# Patient Record
Sex: FEMALE | Employment: FULL TIME | URBAN - METROPOLITAN AREA
[De-identification: names, ages, dates, MRNs, and addresses within clinical notes are randomized per-mention and may not be internally consistent; named-entity substitution may affect disease eponyms.]

---

## 2017-10-24 ENCOUNTER — DOCUMENTATION ONLY (OUTPATIENT)
Dept: SLEEP MEDICINE | Age: 67
End: 2017-10-24

## 2017-11-29 ENCOUNTER — OFFICE VISIT (OUTPATIENT)
Dept: SLEEP MEDICINE | Age: 67
End: 2017-11-29

## 2017-11-29 ENCOUNTER — DOCUMENTATION ONLY (OUTPATIENT)
Dept: SLEEP MEDICINE | Age: 67
End: 2017-11-29

## 2017-11-29 VITALS
BODY MASS INDEX: 21.07 KG/M2 | OXYGEN SATURATION: 95 % | DIASTOLIC BLOOD PRESSURE: 78 MMHG | HEART RATE: 68 BPM | SYSTOLIC BLOOD PRESSURE: 138 MMHG | HEIGHT: 68 IN | WEIGHT: 139 LBS

## 2017-11-29 DIAGNOSIS — G47.33 OBSTRUCTIVE SLEEP APNEA SYNDROME: Primary | ICD-10-CM

## 2017-11-29 RX ORDER — ACETAMINOPHEN 325 MG/1
TABLET ORAL
COMMUNITY

## 2017-11-29 NOTE — PATIENT INSTRUCTIONS
[address] [place]. [place], [address]  Tel.  [phone]  Fax. 100 [facility] 60  [place], [address] [facility]  Tel.  [phone]  Fax. [phone] [address] [facility] [address]  Tel.  [phone]  Fax. [phone]     Sleep Apnea: After Your Visit  Your Care Instructions  Sleep apnea occurs when you frequently stop breathing for 10 seconds or longer during sleep. It can be mild to severe, based on the number of times per hour that you stop breathing or have slowed breathing. Blocked or narrowed airways in your nose, mouth, or throat can cause sleep apnea. Your airway can become blocked when your throat muscles and tongue relax during sleep. Sleep apnea is common, occurring in 1 out of 20 individuals. Individuals having any of the following characteristics should be evaluated and treated right away due to high risk and detrimental consequences from untreated sleep apnea:  1. Obesity  2. Congestive Heart failure  3. Atrial Fibrillation  4. Uncontrolled Hypertension  5. Type II Diabetes  6. Night-time Arrhythmias  7. Stroke  8. Pulmonary Hypertension  9. High-risk Driving Populations (pilots, truck drivers, etc.)  10. Patients Considering Weight-loss Surgery    How do you know you have sleep apnea? You probably have sleep apnea if you answer 'yes' to 3 or more of the following questions:  S - Have you been told that you Snore? T - Are you often Tired during the day? O - Has anyone Observed you stop breathing while sleeping? P- Do you have (or are being treated for) high blood Pressure? B - Are you obese (Body Mass Index > 35)? A - Is your Age 48years old or older? N - Is your Neck size greater than 16 inches? G - Are you male Gender? A sleep physician can prescribe a breathing device that prevents tissues in the throat from blocking your airway.  Or your doctor may recommend using a dental device (oral breathing device) to help keep your airway open. In some cases, surgery may be needed to remove enlarged tissues in the throat. Follow-up care is a key part of your treatment and safety. Be sure to make and go to all appointments, and call your doctor if you are having problems. It's also a good idea to know your test results and keep a list of the medicines you take. How can you care for yourself at home? · Lose weight, if needed. It may reduce the number of times you stop breathing or have slowed breathing. · Go to bed at the same time every night. · Sleep on your side. It may stop mild apnea. If you tend to roll onto your back, sew a pocket in the back of your pajama top. Put a tennis ball into the pocket, and stitch the pocket shut. This will help keep you from sleeping on your back. · Avoid alcohol and medicines such as sleeping pills and sedatives before bed. · Do not smoke. Smoking can make sleep apnea worse. If you need help quitting, talk to your doctor about stop-smoking programs and medicines. These can increase your chances of quitting for good. · Prop up the head of your bed 4 to 6 inches by putting bricks under the legs of the bed. · Treat breathing problems, such as a stuffy nose, caused by a cold or allergies. · Use a continuous positive airway pressure (CPAP) breathing machine if lifestyle changes do not help your apnea and your doctor recommends it. The machine keeps your airway from closing when you sleep. · If CPAP does not help you, ask your doctor whether you should try other breathing machines. A bilevel positive airway pressure machine has two types of air pressureâone for breathing in and one for breathing out. Another device raises or lowers air pressure as needed while you breathe. · If your nose feels dry or bleeds when using one of these machines, talk with your doctor about increasing moisture in the air. A humidifier may help.   · If your nose is runny or stuffy from using a breathing machine, talk with your doctor about using decongestants or a corticosteroid nasal spray. When should you call for help? Watch closely for changes in your health, and be sure to contact your doctor if:  · You still have sleep apnea even though you have made lifestyle changes. · You are thinking of trying a device such as CPAP. · You are having problems using a CPAP or similar machine. Where can you learn more? Go to [employer]. Enter C410 in the search box to learn more about \"Sleep Apnea: After Your Visit. \"   © 6628-5417 Healthwise, [employer]!. Care instructions adapted under license by Madelyn Angulo (which disclaims liability or warranty for this information). This care instruction is for use with your licensed healthcare professional. If you have questions about a medical condition or this instruction, always ask your healthcare professional. Radha Cowing any warranty or liability for your use of this information. PROPER SLEEP HYGIENE    What to avoid  · Do not have drinks with caffeine, such as coffee or black tea, for 8 hours before bed. · Do not smoke or use other types of tobacco near bedtime. Nicotine is a stimulant and can keep you awake. · Avoid drinking alcohol late in the evening, because it can cause you to wake in the middle of the night. · Do not eat a big meal close to bedtime. If you are hungry, eat a light snack. · Do not drink a lot of water close to bedtime, because the need to urinate may wake you up during the night. · Do not read or watch TV in bed. Use the bed only for sleeping and sexual activity. What to try  · Go to bed at the same time every night, and wake up at the same time every morning. Do not take naps during the day. · Keep your bedroom quiet, dark, and cool. · Get regular exercise, but not within 3 to 4 hours of your bedtime. .  · Sleep on a comfortable pillow and mattress.   · If watching the clock makes you anxious, turn it facing away from you so you cannot see the time. · If you worry when you lie down, start a worry book. Well before bedtime, write down your worries, and then set the book and your concerns aside. · Try meditation or other relaxation techniques before you go to bed. · If you cannot fall asleep, get up and go to another room until you feel sleepy. Do something relaxing. Repeat your bedtime routine before you go to bed again. · Make your house quiet and calm about an hour before bedtime. Turn down the lights, turn off the TV, log off the computer, and turn down the volume on music. This can help you relax after a busy day. Drowsy Driving  The [address] [place] [address] Road Traffic Safety Administration cites drowsiness as a causing factor in more than 225,966 police reported crashes annually, resulting in 76,000 injuries and 1,500 deaths. Other surveys suggest 55% of people polled have driven while drowsy in the past year, 23% had fallen asleep but not crashed, 3% crashed, and 2% had and accident due to drowsy driving. Who is at risk? Young Drivers: One study of drowsy driving accidents states that 55% of the drivers were under 25 years. Of those, 75% were male. Shift Workers and Travelers: People who work overnight or travel across time zones frequently are at higher risk of experiencing Circadian Rhythm Disorders. They are trying to work and function when their body is programed to sleep. Sleep Deprived: Lack of sleep has a serious impact on your ability to pay attention or focus on a task. Consistently getting less than the average of 8 hours your body needs creates partial or cumulative sleep deprivation. Untreated Sleep Disorders: Sleep Apnea, Narcolepsy, R.L.S., and other sleep disorders (untreated) prevent a person from getting enough restful sleep. This leads to excessive daytime sleepiness and increases the risk for drowsy driving accidents by up to 7 times.   Medications / Alcohol: Even over the counter medications can cause drowsiness. Medications that impair a drivers attention should have a warning label. Alcohol naturally makes you sleepy and on its own can cause accidents. Combined with excessive drowsiness its effects are amplified. Signs of Drowsy Driving:   * You don't remember driving the last few miles   * You may drift out of your mary   * You are unable to focus and your thoughts wander   * You may yawn more often than normal   * You have difficulty keeping your eyes open / nodding off   * Missing traffic signs, speeding, or tailgating  Prevention-   Good sleep hygiene, lifestyle and behavioral choices have the most impact on drowsy driving. There is no substitute for sleep and the average person requires 8 hours nightly. If you find yourself driving drowsy, stop and sleep. Consider the sleep hygiene tips provided during your visit as well. Medication Refill Policy: Refills for all medications require 1 week advance notice. Please have your pharmacy fax a refill request. We are unable to fax, or call in \"controled substance\" medications and you will need to pick these prescriptions up from our office. [employer] Activation    Thank you for requesting access to [employer]. Please follow the instructions below to securely access and download your online medical record. [employer] allows you to send messages to your doctor, view your test results, renew your prescriptions, schedule appointments, and more. How Do I Sign Up? 1. In your internet browser, go to [email or web address]. 2. Click on the First Time User? Click Here link in the Sign In box. You will see the New Member Sign Up page. 3. Enter your [employer] Access Code exactly as it appears below. You will not need to use this code after youve completed the sign-up process. If you do not sign up before the expiration date, you must request a new code.     [employer] Access Code: [email or web address]  Expires: 2/27/2018  3:29 PM (This is the date your ePrep access code will )    4. Enter the last four digits of your Social Security Number (xxxx) and Date of Birth (mm/dd/yyyy) as indicated and click Submit. You will be taken to the next sign-up page. 5. Create a Micro[employer] Plan[employer] An[employer]nast ID. This will be your ePrep login ID and cannot be changed, so think of one that is secure and easy to remember. 6. Create a ePrep password. You can change your password at any time. 7. Enter your Password Reset Question and Answer. This can be used at a later time if you forget your password. 8. Enter your e-mail address. You will receive e-mail notification when new information is available in [address] 9. Click Sign Up. You can now view and download portions of your medical record. 10. Click the Download Summary menu link to download a portable copy of your medical information. Additional Information    If you have questions, please call [phone]. Remember, ePrep is NOT to be used for urgent needs. For medical emergencies, dial 911.

## 2017-11-29 NOTE — PROGRESS NOTES
[address] [facility] [place]. [place], [address]  Tel.  [phone]  Fax. 100 [facility] 60  [place], [address] [facility]  Tel.  [phone]  Fax. [phone] [address]  Tel.  [phone]  Fax. [phone]         Subjective:      Sue Mcelroy is an 69 y.o. female referredby Dr. Cesilia Salazar,  for evaluation for a sleep disorder. She complains of snoring associated with excessive daytime sleepiness, difficulty falling asleep once awakened. Symptoms began a few years ago, gradually worsening since that time. She usually can fall asleep in variable minutes. . She denies falling asleep while driving. Sue Mcelroy does wake up frequently at night. She is bothered by waking up too early and left unable to get back to sleep. She actually sleeps about 7 hours at night and wakes up about 3 times during the night. She does not work shifts:  . Brett Lai indicates she does get too little sleep at night. Her bedtime is 2200. She awakens at 0400. She does take naps. She takes 2 naps a week lasting 2. She has the following observed behaviors: Loud snoring, Grinding teeth;  . Other remarks:   +morning headaches. Dry throat on waking. [place] Sleepiness Score: 7      No Known Allergies      Current Outpatient Prescriptions:     acetaminophen (TYLENOL) 325 mg tablet, Take  by mouth every four (4) hours as needed for Pain., Disp: , Rfl:     omega-3 fatty acids-vitamin e 1,000 mg cap, Take 1 Cap by mouth daily. , Disp: , Rfl:     CALCIUM PO, Take  by mouth daily. , Disp: , Rfl:     TURMERIC ROOT EXTRACT PO, Take  by mouth daily. , Disp: , Rfl:     traMADol (ULTRAM) 50 mg tablet, Take 1 Tab by mouth every six (6) hours as needed for Pain. Max Daily Amount: 200 mg., Disp: 20 Tab, Rfl: 0    denosumab (PROLIA) 60 mg/mL injection, 60 mg by SubCUTAneous route. Takes twice yearly, Disp: , Rfl:     multivitamin (ONE A DAY) tablet, Take 1 Tab by mouth daily. , Disp: , Rfl:      She  has a past medical history of Ill-defined condition; Osteoporosis; Polio; and Shingles. She  has no past surgical history on file. She family history is not on file. She  reports that she has never smoked. She has never used smokeless tobacco. She reports that she does not drink alcohol or use illicit drugs. Review of Systems:  Constitutional:  No significant weight loss or weight gain. Eyes:  No blurred vision. CVS:  No significant chest pain  Pulm:  No significant shortness of breath  GI:  No significant nausea or vomiting  :  + significant nocturia  Musculoskeletal:  + significant joint pain at night  Skin:  No significant rashes  Neuro:  No significant dizziness   Psych:  No active mood issues    Sleep Review of Systems: notable for no difficulty falling asleep; +frequent awakenings at night;  regular dreaming noted; no nightmares ; +early morning headaches; no memory problems; no concentration issues; no history of any automobile or occupational accidents due to daytime drowsiness. Objective:     Visit Vitals    /78    Pulse 68    Ht 5' 7.5\" (1.715 m)    Wt 139 lb (63 kg)    SpO2 95%    BMI 21.45 kg/m2         General:   Not in acute distress   Eyes:  Anicteric sclerae, no obvious strabismus   Nose:  No obvious nasal septum deviation    Oropharynx:   Class 2 oropharyngeal outlet,  low-lying soft palate, narrow tonsilo-pharyngeal pilars   Tonsils:   tonsils are absent   Neck:   Neck circ. in \"inches\": 12.25; midline trachea   Chest/Lungs:  Equal lung expansion, clear on auscultation    CVS:  Normal rate, regular rhythm; no JVD   Skin:  Warm to touch; no obvious rashes   Neuro:  No focal deficits ; no obvious tremor    Psych:  Normal affect,  normal countenance;          Assessment:       ICD-10-CM ICD-9-CM    1.  Obstructive sleep apnea syndrome G47.33 327.23 POLYSOMNOGRAPHY 1 NIGHT         Plan:     * The patient currently has a Moderate Risk for having sleep apnea.  STOP-BANG score 3.  * PSG was ordered for initial evaluation. She prefers the in lab study. We will follow the American Academy of Sleep Medicine protocol regarding split-night procedures and offering a trial of Positive Airway Pressure (CPAP, BPAP, etc.)  * She was provided information on sleep apnea including coresponding risk factors and the importance of proper treatment. * Counseling was provided regarding proper sleep hygiene and safe driving. Treatment options for sleep apnea were reviewed. she is not against a trial of PAP if found to have significant sleep apnea. She will follow-up with Dr. Tati Santos regarding her nasal symptoms. *I will call her with the results. Thank you for allowing us to participate in your patient's medical care. We'll keep you updated on these investigations.   Shayne Gary MD  Diplomate in Sleep Medicine  [facility]

## 2017-11-29 NOTE — Clinical Note
Thank you for the referral.  I will keep you informed of her progress.  [address], [place]

## 2018-02-06 ENCOUNTER — TELEPHONE (OUTPATIENT)
Dept: SLEEP MEDICINE | Age: 68
End: 2018-02-06

## 2018-02-06 DIAGNOSIS — G47.33 OBSTRUCTIVE SLEEP APNEA SYNDROME: Primary | ICD-10-CM

## 2018-02-06 NOTE — TELEPHONE ENCOUNTER
Patient called stating that she would prefer to do an HST rather than an in lab study. Please advise. She can be reached at [phone].

## 2018-02-14 NOTE — TELEPHONE ENCOUNTER
Patient returned call and has decided to proceed with a PSG as discussed before. Scheduled PSG for 3/21/18.

## 2018-03-21 ENCOUNTER — HOSPITAL ENCOUNTER (OUTPATIENT)
Dept: SLEEP MEDICINE | Age: 68
Discharge: HOME | End: 2018-03-21
Payer: SELF-PAY

## 2018-03-21 DIAGNOSIS — G47.33 OBSTRUCTIVE SLEEP APNEA SYNDROME: ICD-10-CM

## 2018-03-21 PROCEDURE — 95810 POLYSOM 6/> YRS 4/> PARAM: CPT | Performed by: INTERNAL MEDICINE

## 2018-03-22 VITALS
TEMPERATURE: 98 F | HEIGHT: 68 IN | BODY MASS INDEX: 21.5 KG/M2 | HEART RATE: 74 BPM | OXYGEN SATURATION: 96 % | WEIGHT: 141.9 LBS | DIASTOLIC BLOOD PRESSURE: 82 MMHG | SYSTOLIC BLOOD PRESSURE: 127 MMHG

## 2018-03-26 ENCOUNTER — TELEPHONE (OUTPATIENT)
Dept: SLEEP MEDICINE | Age: 68
End: 2018-03-26

## 2018-03-26 NOTE — TELEPHONE ENCOUNTER
Polysomnogram was performed and the results of the study were explained to the patient. Please refer to interpretation report for further details. Apnea/Hypopnea index of 2 which indicates no significant apnea. She continues to have snoring, nasal congestion. She did not have nasal congestion at the sleep center and snoring was mild. She is wondering if she is allergic to her cat.     All of her questions addressed

## 2021-02-24 ENCOUNTER — TELEPHONE (OUTPATIENT)
Dept: WOUND CARE | Age: 71
End: 2021-02-24

## 2021-02-24 NOTE — TELEPHONE ENCOUNTER
New Patient Appointment Reminder and [phone] Screening     Have you been tested for or have you been around anyone that has confirmed or been suspected positive for Covid-19 in past 14 days? No      If yes then  CANCEL VISIT AND MAKE A VIRTUAL VISIT IF POSSIBLE  :    Stay home and monitor your health   Stay home for 14 days after your last contact with a person who has COVID-19    Watch for fever (100. 4? F), cough, shortness of breath, or other symptoms of COVID-19 advise CDC website for more information   If possible, stay away from others, especially people who are at higher risk for getting very sick from COVID-19    Does Patient have fever and/or lower respiratory symptoms, (shortness of breath, difficulty breathing, cough) loss taste or smell, sore throat, muscle or body aches, nasal congestion or runny nose, nausea/vomiting,diarrhea ? No     If yes then CANCEL VISIT AND MAKE A VIRTUAL VISIT IF POSSIBLE :    Referred to PCP or to the closest ED   No       Open travel questions and document patient responses. If No stop here and give patient reminder time for appointment and ask them please limit to having only 1 person accompany to appointment if necessary, no children under 18 are allowed at visits. Please call into office day of appointment to notify arrival.Remind all patients and caretakers they must wear a mask when entering into the wound clinic. Reminder Appointment  date and time given: Yes      [email or web address]    [email or web address]      ns and document patient responses. Not COVID-19. Proceed with scheduling  appointment    1. Provider instructs patient to travel directly to inpatient ED. 2. Provider to notify local ED of patients pending arrival.  3. Notify local health department and infection prevention immediately.   4. Obtain exposure list to include staff, others in waiting room and transport staff and  submit to infection prevention. Communication to the patient:   Due to your recent history and reported symptoms, we ask that you go to an emergency  department for further evaluation.  You should wear a facemask when you are in the same room with other people and  when you visit a health care provider. If you cannot wear a facemask, other practical  means of protection can be used, such as a wash cloth.  Cover your mouth and nose with a tissue when you cough or sneeze, or you can  cough or sneeze into your sleeve. Throw used tissues away, and immediately wash  your hands with soap and water for at least 20 seconds.  Wash your hands often and thoroughly with soap and water for at least 20 seconds. You can use an alcohol-based hand , if soap and water are not available.

## 2021-02-25 ENCOUNTER — HOSPITAL ENCOUNTER (OUTPATIENT)
Dept: WOUND CARE | Age: 71
Discharge: HOME | End: 2021-02-25
Payer: MEDICARE

## 2021-02-25 VITALS
HEART RATE: 65 BPM | SYSTOLIC BLOOD PRESSURE: 147 MMHG | RESPIRATION RATE: 18 BRPM | TEMPERATURE: 97.3 F | WEIGHT: 139 LBS | BODY MASS INDEX: 21.82 KG/M2 | HEIGHT: 67 IN | DIASTOLIC BLOOD PRESSURE: 81 MMHG

## 2021-02-25 PROCEDURE — 11042 DBRDMT SUBQ TIS 1ST 20SQCM/<: CPT

## 2021-02-25 PROCEDURE — 99213 OFFICE O/P EST LOW 20 MIN: CPT

## 2021-02-25 NOTE — WOUND CARE
Gonzalez Salinas DPM - Renee Pozo. [facility], [address] [place] [address] - H&P Assessment/Plan: 
Left foot stage 3 pressure ulcer (S90.020) - Pt evaluated and treated. - Wound debrided as noted in the Procedure Note to healthy granular bleeding margins. 
- Dressing consisting of betadine paint applied. 
- Pt has bilateral AFO braces and an insert for her right foot. Suggested she get insert for left with an offloading delve. Will bring felt to make a donut cut out.  
- F/U 2 weeks. Subjective: 
Pt complains of wound to the left foot. Previous tx include neosporin. Denies having symptoms of fever, chills, nausea, vomiting, chest pain, shortness of breath. HPI: Mr. Kristina Cordero is a 77yo female with a PMH of osteoporosis, polio, and shingles who presents with a left foot wound. She first noticed an issue February 14th with redness to her dorsal lateral foot. She went to [facility] and they prescribed Doxycyline for cellulitis. She has always had a small callus in the area, but thinks it got worse when she changed shoes. Her foot seemed to slip around more in the shoe. ROS: 
Consitutional: no weight loss, night sweats, fatigue / malaise / lethargy. Musculoskeletal: no joint / extremity pain, misalignment, stiffness, decreased ROM, crepitus. Integument: No pruritis, rashes, lesions, left foot wound. Psychiatric: No depression, anxiety, paranoia History: 
Left foot wound No Known Allergies Family History Problem Relation Age of Onset  Dementia Father Past Medical History:  
Diagnosis Date  Ill-defined condition   
 fall, sacral bone fracture  Osteoporosis R hip  Polio  Shingles Past Surgical History:  
Procedure Laterality Date  HX HEENT    
 tonsillectomy  HX ORTHOPAEDIC    
 right leg - polio Social History Tobacco Use  Smoking status: Never Smoker  Smokeless tobacco: Never Used Substance Use Topics  Alcohol use: No  
  Comment: rarely Social History Substance and Sexual Activity Alcohol Use No  
 Comment: rarely Social History Substance and Sexual Activity Drug Use No  
  
Social History Tobacco Use Smoking Status Never Smoker Smokeless Tobacco Never Used Current Outpatient Medications Medication Sig  
 ascorbic acid (SUPER C PO) Take  by mouth.  b complex-vitamin c-folic acid (NEPHROCAPS) 1 mg capsule Take 1 Cap by mouth daily.  denosumab (PROLIA) 60 mg/mL injection 60 mg by SubCUTAneous route. Takes twice yearly  CALCIUM PO Take  by mouth daily.  TURMERIC ROOT EXTRACT PO Take  by mouth daily.  traMADol (ULTRAM) 50 mg tablet Take 1 Tab by mouth every six (6) hours as needed for Pain. Max Daily Amount: 200 mg.  acetaminophen (TYLENOL) 325 mg tablet Take  by mouth every four (4) hours as needed for Pain.  omega-3 fatty acids-vitamin e 1,000 mg cap Take 1 Cap by mouth daily.  multivitamin (ONE A DAY) tablet Take 1 Tab by mouth daily. No current facility-administered medications for this encounter. Objective: 
Visit Vitals BP (!) 147/81 (BP 1 Location: Left upper arm, BP Patient Position: Sitting) Pulse 65 Temp 97.3 °F (36.3 °C) Resp 18 Ht 5' 7\" (1.702 m) Wt 63 kg (139 lb) BMI 21.77 kg/m² Vascular: B/L LE 
DP 2/4; PT 2/4 
capillary fill time brisk, pitting edema is present, skin temperature is cool, varicosities are present. Dermatological: 
 
Wound: 1 Location: left foot submet 5 Measurements: per RN note Margins: hyperkeratotic Drainage: scant serous Odor: none Wound base: granular Lymphangitic streaking? No. 
Undermining? No. 
Sinus tracts? No. 
Exposed bone? No. 
Subcutaneous crepitation on palpation? No. 
 
 
Nails WNL. Skin is dry and scaly, exhibits hemosiderin deposition.    
 
There is no maceration of the interspaces of the feet b/l. Lesions are present consisting of hyperkeratosis at left submet 5. Neurological: DTR are present, protective sensation per 5.07 [place] monofilament is intact, patient is AAOx3, mood is normal. Epicritic sensation is intact. Orthopedic: 
LE deformities due to Polio. ROM of ankle, STJ, 1st MTPJ is limited, MMT 4+ out of 5 for B/L LE. No pedal amputations. Constitutional: Pt is a well developed, pleasant and active female. 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 Lymphatics: negative tenderness to palpation of neck/axillary/inguinal nodes.  
 
Imaging / Brit Monte / Cx / Px: 
Skipper Lemon

## 2021-02-25 NOTE — DISCHARGE INSTRUCTIONS
Discharge Instructions for  [address], [address] [place] [address] Avenue  Telephone: [phone]    NAME:  Antoinette Chacko OF BIRTH:  1950  MEDICAL RECORD NUMBER:  [id number]  DATE:  2/25/2021     Dressings:           Wound Location  Left foot   [x] Apply Primary Dressing:  Paint with betadine and cover with foam border dressing. Change Daily. Dietary:  [x] Diet as tolerated: [x] Increase Protein: Activity:  [x] Activity as tolerated:               Return Appointment:  [x] Return Appointment: With  Rossy  in   2  [facility])     Electronically signed on 2/25/2021 at 9:34 AM     Rosalina Mccrary 281: Should you experience any significant changes in your wound(s) or have questions about your wound care, please contact the [facility] Main at [address] [place] [address] 8:00 am - 4:30. If you need help with your wound outside these hours and cannot wait until we are again available, contact your PCP or go to the hospital emergency room. PLEASE NOTE: IF YOU ARE UNABLE TO OBTAIN WOUND SUPPLIES, CONTINUE TO USE THE SUPPLIES YOU HAVE AVAILABLE UNTIL YOU ARE ABLE TO REACH US. IT IS MOST IMPORTANT TO KEEP THE WOUND COVERED AT ALL TIMES.       Physician Signature:_______________________    Date: ___________ Time:  ____________

## 2021-02-25 NOTE — WOUND CARE
02/25/21 5225 Anesthetic Anesthetic 4% Lidocaine Cream  
Right Leg Edema Point of Measurement Leg circumference 24.4 cm Ankle circumference 19.5 cm Compression Therapy No  
Left Leg Edema Point of Measurement Leg circumference 25.7 cm Ankle circumference 20.5 cm Compression Therapy No  
Peripheral Vascular Peripheral Vascular (WDL) WDL LLE Peripheral Vascular Capillary Refill Less than/equal to 3 seconds Color Appropriate for race Temperature Cool Sensation Present Pedal Pulse Palpable RLE Peripheral Vascular Capillary Refill Less than/equal to 3 seconds Color Appropriate for race Temperature Cool Sensation Present Pedal Pulse Palpable Ankle-Brachial Index Right Arm Systolic Pressure 510 mmHg Left Arm Systolic Pressure 367 Right Ankle Systolic Pressure: Posterior Tibial (PT) 150 mmHg Right Ankle Systolic Pressure: Dorsalis Pedis (DP) 150 mmHg Left Ankle Systolic Pressure: Posterior Tibial (PT) 154 mmHg Left Ankle Systolic Pressure: Dorsalis Pedis (DP) 154 mmHg Right VERONICA 1.02 mmHg Left VERONICA 1.05 mmHg Overall VERONICA (Lower VERONICA) 1.02 mmHg Musculoskeletal  
RLE Shortened (hx polio) Musculoskeletal  
Musculoskeletal (WDL) X Toe Nail Assessment Toe Nail Assessment WDL Wound Foot Left;Plantar #1 Date First Assessed/Time First Assessed: 02/25/21 0916   Present on Hospital Admission: Yes  Primary Wound Type: Callus/corn  Location: Foot  Wound Location Orientation: Left;Plantar  Wound Description: #1 Wound Image Dressing Status Dry Cleansed Irrigated with saline Offloading for Diabetic Foot Ulcers No  
Wound Length (cm) 0.1 cm Wound Width (cm) 0.1 cm Wound Depth (cm) 0.1 cm Wound Surface Area (cm^2) 0.01 cm^2 Wound Volume (cm^3) 0 cm^3 Wound Assessment Dry;Superficial  
Drainage Amount None Wound Odor None Apple-Wound/Incision Assessment Hyperkeratosis (Callous) Edges Attached edges Wound Thickness Description Partial thickness Pain 1 Pain Scale 1 Numeric (0 - 10) Pain Intensity 1 3 Pain Location 1 Foot Pain Orientation 1 Left Pain Description 1 Aching Visit Vitals BP (!) 147/81 (BP 1 Location: Left upper arm, BP Patient Position: Sitting) Pulse 65 Temp 97.3 °F (36.3 °C) Resp 18

## 2021-03-10 ENCOUNTER — TELEPHONE (OUTPATIENT)
Dept: WOUND CARE | Age: 71
End: 2021-03-10

## 2021-03-10 NOTE — TELEPHONE ENCOUNTER
New Patient Appointment Reminder and COVID-19 Screening      Have you been tested for or have you been around anyone that has confirmed or been suspected positive for Covid-19 in past 14 days? No       If yes then  CANCEL VISIT AND MAKE A VIRTUAL VISIT IF POSSIBLE  :     Stay home and monitor your health  · Stay home for 14 days after your last contact with a person who has COVID-19   · Watch for fever (100. 4? F), cough, shortness of breath, or other symptoms of COVID-19 advise CDC website for more information  · If possible, stay away from others, especially people who are at higher risk for getting very sick from COVID-19     Does Patient have fever and/or lower respiratory symptoms, (shortness of breath, difficulty breathing, cough) loss taste or smell, sore throat, muscle or body aches, nasal congestion or runny nose, nausea/vomiting,diarrhea ? No      If yes then CANCEL VISIT AND MAKE A VIRTUAL VISIT IF POSSIBLE :     Referred to PCP or to the closest ED   No         Open travel questions and document patient responses. If No stop here and give patient reminder time for appointment and ask them please limit to having only 1 person accompany to appointment if necessary, no children under 18 are allowed at visits. Please call into office day of appointment to notify arrival.Remind all patients and caretakers they must wear a mask when entering into the wound clinic. Reminder Appointment  date and time given: Yes        [email or web address]     [email or web address]

## 2021-03-11 ENCOUNTER — HOSPITAL ENCOUNTER (OUTPATIENT)
Dept: WOUND CARE | Age: 71
Discharge: HOME | End: 2021-03-11
Payer: MEDICARE

## 2021-03-11 VITALS — HEART RATE: 77 BPM | DIASTOLIC BLOOD PRESSURE: 77 MMHG | SYSTOLIC BLOOD PRESSURE: 129 MMHG | TEMPERATURE: 96.6 F

## 2021-03-11 PROCEDURE — 99212 OFFICE O/P EST SF 10 MIN: CPT

## 2021-03-11 NOTE — WOUND CARE
Drew Mcclendon DPM - Elsie Marva Beard. Nathan Escamilla, [address] [place] [address] - H&P Assessment/Plan: 
Left foot stage 3 pressure ulcer (H12.044) - Pt evaluated and treated. - Pt presents for follow up of left foot wound - healed - Pt has bilateral AFO braces and an insert for her right foot. Suggested she get insert for left with an offloading delve. - Pt discharged from wound care clinic Subjective: 
Pt complains of wound to the left foot. Previous tx include neosporin. Denies having symptoms of fever, chills, nausea, vomiting, chest pain, shortness of breath. HPI: Mr. Ira Pérez is a 77yo female with a PMH of osteoporosis, polio, and shingles who presents with a left foot wound. She first noticed an issue February 14th with redness to her dorsal lateral foot. She went to [facility] and they prescribed Doxycyline for cellulitis. She has always had a small callus in the area, but thinks it got worse when she changed shoes. Her foot seemed to slip around more in the shoe. ROS: 
Consitutional: no weight loss, night sweats, fatigue / malaise / lethargy. Musculoskeletal: no joint / extremity pain, misalignment, stiffness, decreased ROM, crepitus. Integument: No pruritis, rashes, lesions, left foot wound. Psychiatric: No depression, anxiety, paranoia History: 
left foot wound No Known Allergies Family History Problem Relation Age of Onset  Dementia Father Past Medical History:  
Diagnosis Date  Ill-defined condition   
 fall, sacral bone fracture  Osteoporosis R hip  Polio  Shingles Past Surgical History:  
Procedure Laterality Date  HX HEENT    
 tonsillectomy  HX ORTHOPAEDIC    
 right leg - polio Social History Tobacco Use  Smoking status: Never Smoker  Smokeless tobacco: Never Used Substance Use Topics  Alcohol use: No  
  Comment: rarely Social History Substance and Sexual Activity Alcohol Use No  
 Comment: rarely Social History Substance and Sexual Activity Drug Use No  
  
Social History Tobacco Use Smoking Status Never Smoker Smokeless Tobacco Never Used Current Outpatient Medications Medication Sig  
 ascorbic acid (SUPER C PO) Take  by mouth.  b complex-vitamin c-folic acid (NEPHROCAPS) 1 mg capsule Take 1 Cap by mouth daily.  omega-3 fatty acids-vitamin e 1,000 mg cap Take 1 Cap by mouth daily.  CALCIUM PO Take  by mouth daily.  TURMERIC ROOT EXTRACT PO Take  by mouth daily.  multivitamin (ONE A DAY) tablet Take 1 Tab by mouth daily.  acetaminophen (TYLENOL) 325 mg tablet Take  by mouth every four (4) hours as needed for Pain.  denosumab (PROLIA) 60 mg/mL injection 60 mg by SubCUTAneous route. Takes twice yearly  traMADol (ULTRAM) 50 mg tablet Take 1 Tab by mouth every six (6) hours as needed for Pain. Max Daily Amount: 200 mg. No current facility-administered medications for this encounter. Objective: 
Visit Vitals /77 (BP 1 Location: Left upper arm, BP Patient Position: At rest) Pulse 77 Temp (!) 96.6 °F (35.9 °C) Vascular: B/L LE 
DP 2/4; PT 2/4 
capillary fill time brisk, pitting edema is present, skin temperature is cool, varicosities are present. Dermatological: 
 
Wound: 1 Location: left foot submet 5 Healed Nails WNL. Skin is dry and scaly, exhibits hemosiderin deposition. There is no maceration of the interspaces of the feet b/l. Lesions are present consisting of hyperkeratosis at left submet 5. Neurological: DTR are present, protective sensation per 5.07 Hel[place] William monofilament is intact, patient is AAOx3, mood is normal. Epicritic sensation is intact. Orthopedic: 
LE deformities due to Polio. ROM of ankle, STJ, 1st MTPJ is limited, MMT 4+ out of 5 for B/L LE.  No pedal amputations. Constitutional: Pt is a well developed, pleasant and active female. 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 Lymphatics: negative tenderness to palpation of neck/axillary/inguinal nodes.  
 
Imaging / Duran Flakes / Cx / Px: 
Allen Qureshi

## 2021-03-11 NOTE — DISCHARGE INSTRUCTIONS
Discharge Instructions for  [address], [address] [place] [address] Avenue  Telephone: [phone]    NAME:  Corni Mock OF BIRTH:  1950  MEDICAL RECORD NUMBER:  [id number]  DATE:  03/11/2021     Dressings:           Wound Location  Left foot   [x] Apply Primary Dressing: Cover with foam border dressing. Dietary:  [x] Diet as tolerated: [x] Increase Protein: Activity:  [x] Activity as tolerated:               Return Appointment:  [x] Return Appointment: With  Ulises Moses as needed      Electronically signed on 03/11/2021 at 10:08 AM     Rosalina Mccrary 281: Should you experience any significant changes in your wound(s) or have questions about your wound care, please contact the [facility] Main at [address] [place] [address] 8:00 am - 4:30. If you need help with your wound outside these hours and cannot wait until we are again available, contact your PCP or go to the hospital emergency room. PLEASE NOTE: IF YOU ARE UNABLE TO OBTAIN WOUND SUPPLIES, CONTINUE TO USE THE SUPPLIES YOU HAVE AVAILABLE UNTIL YOU ARE ABLE TO REACH US. IT IS MOST IMPORTANT TO KEEP THE WOUND COVERED AT ALL TIMES.       Physician Signature:_______________________    Date: ___________ Time:  ____________

## 2021-03-11 NOTE — WOUND CARE
03/11/21 6101 Anesthetic Anesthetic 4% Lidocaine Cream  
Left Leg Edema Point of Measurement Leg circumference 25.8 cm Ankle circumference 20 cm Compression Therapy No  
Peripheral Vascular Peripheral Vascular (WDL) WDL LLE Peripheral Vascular Capillary Refill Less than/equal to 3 seconds Color Appropriate for race Temperature Warm Sensation Present Pedal Pulse Palpable Musculoskeletal  
Musculoskeletal (WDL) WDL Foot Assessment Foot Assessment WDL Toe Nail Assessment Toe Nail Assessment WDL Wound Foot Left;Plantar #1 Date First Assessed/Time First Assessed: 02/25/21 0916   Present on Hospital Admission: Yes  Primary Wound Type: Callus/corn  Location: Foot  Wound Location Orientation: Left;Plantar  Wound Description: #1 Wound Image Dressing Status Intact Cleansed Cleansed with saline Wound Length (cm) 0.1 cm Wound Width (cm) 0.1 cm Wound Depth (cm) 0.1 cm Wound Surface Area (cm^2) 0.01 cm^2 Change in Wound Size % 0 Wound Volume (cm^3) 0 cm^3 Wound Assessment Epithelialization Drainage Amount Moderate Drainage Description Serosanguinous Wound Odor None Visit Vitals /77 (BP 1 Location: Left upper arm, BP Patient Position: At rest) Pulse 77 Temp (!) 96.6 °F (35.9 °C)

## 2021-03-11 NOTE — WOUND CARE
03/11/21 1013 Wound Foot Left;Plantar #1 Date First Assessed/Time First Assessed: 02/25/21 0916   Present on Hospital Admission: Yes  Primary Wound Type: Callus/corn  Location: Foot  Wound Location Orientation: Left;Plantar  Wound Description: #1 Dressing/Treatment  
(foam border dressing. ) Discharge Condition: Stable Pain: 0 Ambulatory Status: Walking Discharge Destination: Home Transportation: Car Accompanied by: Self Discharge instructions reviewed with Patient and copy or written instructions have been provided. All questions/concerns have been addressed at this time.

## 2025-03-24 ENCOUNTER — TRANSCRIBE ORDERS (OUTPATIENT)
Facility: HOSPITAL | Age: 75
End: 2025-03-24

## 2025-03-24 DIAGNOSIS — M75.41 IMPINGEMENT SYNDROME OF RIGHT SHOULDER: ICD-10-CM

## 2025-03-24 DIAGNOSIS — M12.811 ROTATOR CUFF ARTHROPATHY, RIGHT: Primary | ICD-10-CM

## 2025-03-24 DIAGNOSIS — M67.911 TENDINOPATHY OF RIGHT ROTATOR CUFF: ICD-10-CM

## 2025-04-28 ENCOUNTER — HOSPITAL ENCOUNTER (OUTPATIENT)
Facility: HOSPITAL | Age: 75
Discharge: HOME | End: 2025-05-01
Payer: MEDICARE

## 2025-04-28 DIAGNOSIS — M75.41 IMPINGEMENT SYNDROME OF RIGHT SHOULDER: ICD-10-CM

## 2025-04-28 DIAGNOSIS — M67.911 TENDINOPATHY OF RIGHT ROTATOR CUFF: ICD-10-CM

## 2025-04-28 DIAGNOSIS — M12.811 ROTATOR CUFF ARTHROPATHY, RIGHT: ICD-10-CM

## 2025-04-28 PROCEDURE — 73221 MRI JOINT UPR EXTREM W/O DYE: CPT
